# Patient Record
Sex: MALE | Race: WHITE | URBAN - NONMETROPOLITAN AREA
[De-identification: names, ages, dates, MRNs, and addresses within clinical notes are randomized per-mention and may not be internally consistent; named-entity substitution may affect disease eponyms.]

---

## 2017-05-03 ENCOUNTER — OFFICE VISIT - GICH (OUTPATIENT)
Dept: FAMILY MEDICINE | Facility: OTHER | Age: 37
End: 2017-05-03

## 2017-05-03 ENCOUNTER — HISTORY (OUTPATIENT)
Dept: FAMILY MEDICINE | Facility: OTHER | Age: 37
End: 2017-05-03

## 2017-05-03 DIAGNOSIS — L02.215 CUTANEOUS ABSCESS OF PERINEUM: ICD-10-CM

## 2018-01-04 NOTE — NURSING NOTE
Patient Information     Patient Name MRN Sex Silvano Medrano [id number] Male 1980      Nursing Note by Isabell Cardenas at 5/3/2017  3:15 PM     Author:  Isabell Cardenas Service:  (none) Author Type:  (none)     Filed:  5/3/2017  4:14 PM Encounter Date:  5/3/2017 Status:  Signed     :  Isabell Cardenas            Patient presents to clinic with boil on right side of inner thigh.  Isabell Mao ....................  5/3/2017   4:01 PM

## 2018-01-04 NOTE — PATIENT INSTRUCTIONS
Patient Information     Patient Name MRN Sex Silvano Medrano [id number] Male 1980      Patient Instructions by Joanna Escalante NP at 5/3/2017  3:15 PM     Author:  Joanna Escalante NP Service:  (none) Author Type:  PHYS- Nurse Practitioner     Filed:  5/3/2017  4:26 PM Encounter Date:  5/3/2017 Status:  Signed     :  Joanna Escalante NP (PHYS- Nurse Practitioner)            Cellulitis - Take the antibiotic as prescribed. Antibiotic has been sent to pharmacy. Please take full course of antibiotic even if symptoms have completely resolved. This helps prevent against antibiotic resistance.     Watch for any signs of increasing infection (redness, pus, increased pain, increased swelling or fever). Call if any of these signs occur. Monitor for fevers or chills.    Call or return to clinic as needed if your symptoms worsen or fail to improve as anticipated.    We will have you follow up on Friday with General Surgery.

## 2018-01-04 NOTE — PROGRESS NOTES
"Patient Information     Patient Name MRN Sex Silvano Medrano [id number] Male 1980      Progress Notes by Joanna Escalante NP at 5/3/2017  3:15 PM     Author:  Joanna Escalante NP Service:  (none) Author Type:  PHYS- Nurse Practitioner     Filed:  5/3/2017  5:52 PM Encounter Date:  5/3/2017 Status:  Signed     :  Joanna Escalante NP (PHYS- Nurse Practitioner)            Nursing Notes:   Isabell Cardenas  5/3/2017  4:14 PM  Signed  Patient presents to clinic with boil on right side of inner thigh.  Isabell CardenasLPN ....................  5/3/2017   4:01 PM    SUBJECTIVE:    Silvano Sellers is a 37 y.o. male who presents for boil on inner thigh    Abscess    This is a new problem. The current episode started less than one week ago. The onset was sudden. The problem has been gradually worsening. The abscess is present on the left upper leg, left buttock and groin. The problem is moderate. The abscess is characterized by redness, painfulness, burning, draining and swelling. Pertinent negatives include no anorexia, no decrease in physical activity, not sleeping less, not drinking less, no fever, not sleeping more, no diarrhea, no vomiting, no rhinorrhea, no sore throat and no decreased responsiveness. His past medical history does not include skin abscesses in family. There were no sick contacts. He has received no recent medical care.     Had similar abscess many years ago, unsure what caused it.     No current outpatient prescriptions on file prior to visit.     No current facility-administered medications on file prior to visit.        REVIEW OF SYSTEMS:  Review of Systems   Constitutional: Negative for decreased responsiveness and fever.   HENT: Negative for rhinorrhea and sore throat.    Gastrointestinal: Negative for anorexia, diarrhea and vomiting.       OBJECTIVE:  /74  Pulse 72  Temp 98.4  F (36.9  C) (Tympanic)  Ht 1.74 m (5' 8.5\")  Wt 77.7 kg (171 lb 3.2 oz)  BMI 25.65 " kg/m2    EXAM:   Physical Exam   Constitutional: He is well-developed, well-nourished, and in no distress.   HENT:   Head: Normocephalic and atraumatic.   Eyes: Conjunctivae are normal.   Cardiovascular: Normal rate.    Pulmonary/Chest: Effort normal. No respiratory distress.   Skin: Skin is warm and dry. There is erythema.   On the LT upper posterior thigh into the perineum there is a red, swollen indurated cellulitis. Very tender to touch. Closer to the perineum there is a firm nodule felt, likely an early abscess.    Nursing note and vitals reviewed.      ASSESSMENT/PLAN:    ICD-10-CM    1. Abscess of perineum L02.215 trimethoprim-sulfamethoxazole, 160-800 mg, (BACTRIM DS, SEPTRA DS) tablet      traMADol (ULTRAM) 50 mg tablet        Plan:  At this point it was felt the abscess would not drain anything. I and D not done. He already noted mild d/c 2 days ago, none seen at this point. He will start Bactrim today and attempt to get both doses in today. He was told to hot pack the area often over the next 1.5 days and f/u with general surgery on Friday, in case an I and d needs to be done but also for a f/u.       KENNY FERRIS NP ....................  5/3/2017   5:52 PM

## 2018-01-28 VITALS
BODY MASS INDEX: 25.36 KG/M2 | HEIGHT: 69 IN | SYSTOLIC BLOOD PRESSURE: 128 MMHG | WEIGHT: 171.2 LBS | DIASTOLIC BLOOD PRESSURE: 74 MMHG | HEART RATE: 72 BPM | TEMPERATURE: 98.4 F

## 2018-02-16 ENCOUNTER — DOCUMENTATION ONLY (OUTPATIENT)
Dept: FAMILY MEDICINE | Facility: OTHER | Age: 38
End: 2018-02-16

## 2018-02-16 RX ORDER — TRAMADOL HYDROCHLORIDE 50 MG/1
50 TABLET ORAL EVERY 6 HOURS PRN
COMMUNITY
Start: 2017-05-03